# Patient Record
Sex: FEMALE | Race: WHITE
[De-identification: names, ages, dates, MRNs, and addresses within clinical notes are randomized per-mention and may not be internally consistent; named-entity substitution may affect disease eponyms.]

---

## 2019-08-11 ENCOUNTER — HOSPITAL ENCOUNTER (EMERGENCY)
Dept: HOSPITAL 35 - ER | Age: 84
Discharge: HOME | End: 2019-08-11
Payer: COMMERCIAL

## 2019-08-11 VITALS — WEIGHT: 160.01 LBS | HEIGHT: 65 IN | BODY MASS INDEX: 26.66 KG/M2

## 2019-08-11 VITALS — DIASTOLIC BLOOD PRESSURE: 51 MMHG | SYSTOLIC BLOOD PRESSURE: 144 MMHG

## 2019-08-11 DIAGNOSIS — I10: ICD-10-CM

## 2019-08-11 DIAGNOSIS — E11.9: ICD-10-CM

## 2019-08-11 DIAGNOSIS — E78.00: ICD-10-CM

## 2019-08-11 DIAGNOSIS — N17.9: Primary | ICD-10-CM

## 2019-08-11 DIAGNOSIS — R06.00: ICD-10-CM

## 2019-08-11 DIAGNOSIS — I48.91: ICD-10-CM

## 2019-08-11 DIAGNOSIS — Z88.0: ICD-10-CM

## 2019-08-11 DIAGNOSIS — Z88.6: ICD-10-CM

## 2019-08-11 LAB
ALBUMIN SERPL-MCNC: 3.2 G/DL (ref 3.4–5)
ALT SERPL-CCNC: 28 U/L (ref 30–65)
ANION GAP SERPL CALC-SCNC: 10 MMOL/L (ref 7–16)
AST SERPL-CCNC: 50 U/L (ref 15–37)
BASOPHILS NFR BLD AUTO: 0.8 % (ref 0–2)
BILIRUB DIRECT SERPL-MCNC: 0.2 MG/DL
BILIRUB SERPL-MCNC: 0.7 MG/DL
BUN SERPL-MCNC: 27 MG/DL (ref 7–18)
CALCIUM SERPL-MCNC: 9.6 MG/DL (ref 8.5–10.1)
CHLORIDE SERPL-SCNC: 105 MMOL/L (ref 98–107)
CO2 SERPL-SCNC: 22 MMOL/L (ref 21–32)
CREAT SERPL-MCNC: 1.4 MG/DL (ref 0.6–1)
EOSINOPHIL NFR BLD: 1.3 % (ref 0–3)
ERYTHROCYTE [DISTWIDTH] IN BLOOD BY AUTOMATED COUNT: 14.5 % (ref 10.5–14.5)
GLUCOSE SERPL-MCNC: 178 MG/DL (ref 74–106)
GRANULOCYTES NFR BLD MANUAL: 74.3 % (ref 36–66)
HCT VFR BLD CALC: 28.3 % (ref 37–47)
HGB BLD-MCNC: 9.4 GM/DL (ref 12–15)
LYMPHOCYTES NFR BLD AUTO: 14.1 % (ref 24–44)
MCH RBC QN AUTO: 28.2 PG (ref 26–34)
MCHC RBC AUTO-ENTMCNC: 33.3 G/DL (ref 28–37)
MCV RBC: 84.8 FL (ref 80–100)
MONOCYTES NFR BLD: 9.5 % (ref 1–8)
NEUTROPHILS # BLD: 7.6 THOU/UL (ref 1.4–8.2)
PLATELET # BLD: 262 THOU/UL (ref 150–400)
POTASSIUM SERPL-SCNC: 4.3 MMOL/L (ref 3.5–5.1)
PROT SERPL-MCNC: 6.5 G/DL (ref 6.4–8.2)
RBC # BLD AUTO: 3.34 MIL/UL (ref 4.2–5)
SODIUM SERPL-SCNC: 137 MMOL/L (ref 136–145)
TROPONIN I SERPL-MCNC: <0.06 NG/ML (ref ?–0.06)
WBC # BLD AUTO: 10.2 THOU/UL (ref 4–11)

## 2019-08-12 NOTE — EKG
[place] [address] [facility] [email or web address]
[place]  [address]
Phone:  [phone]                    ELECTROCARDIOGRAM REPORT      
_______________________________________________________________________________
 
Name:       MANDO OCONNELL          Room #:                     DEP [facility]#:      [id number]     Account #:      [id number]  
Admission:  19    Attend Phys:                          
Discharge:  19    Date of Birth:  [birth year]  
                                                          Report #: [id number]
   [id number]
_______________________________________________________________________________
THIS REPORT FOR:   //name//                          
 
                         [facility] ED
                                       
Test Date:    2019               Test Time:    16:12:32
Pat Name:     MANDO OCONNELL         Department:   
Patient ID:   [id number]            Room:          
Gender:       F                        Technician:   
:          [id number]               Requested By: Sumaya Arevalo
Order Number: [id number] MD:   Humphrey Craft
                                 Measurements
Intervals                              Axis          
Rate:         64                       P:            0
OR:           313                      QRS:          -3
QRSD:         103                      T:            102
QT:           431                                    
QTc:          445                                    
                           Interpretive Statements
Sinus rhythm
Prolonged OR interval
Early R-wave progression
Nonspecific ST and T wave abnormality
Compared to ECG 2009 12:23:13
Early R-wave progression is now present
Nonspecific ST and T wave abnormality is present
Electronically Signed On 2019 8:13:36 CDT by Humphrey Craft
[email or web address]
 
 
 
 
 
 
 
 
 
 
 
 
 
 
 
 
  <ELECTRONICALLY SIGNED>
   By: Humphrey Craft MD, [facility]   
  19     0813
D: 19 1612                           _____________________________________
T: 19 161                           Humphrey Craft MD, [facility]     /EPI

## 2019-08-28 ENCOUNTER — HOSPITAL ENCOUNTER (OUTPATIENT)
Dept: HOSPITAL 61 - PCVCCLINIC | Age: 84
Discharge: HOME | End: 2019-08-28
Attending: INTERNAL MEDICINE
Payer: MEDICARE

## 2019-08-28 DIAGNOSIS — E78.5: ICD-10-CM

## 2019-08-28 DIAGNOSIS — E78.00: ICD-10-CM

## 2019-08-28 DIAGNOSIS — Z79.82: ICD-10-CM

## 2019-08-28 DIAGNOSIS — I10: Primary | ICD-10-CM

## 2019-08-28 DIAGNOSIS — R00.1: ICD-10-CM

## 2019-08-28 DIAGNOSIS — E11.9: ICD-10-CM

## 2019-08-28 DIAGNOSIS — R06.02: ICD-10-CM

## 2019-08-28 DIAGNOSIS — Z88.0: ICD-10-CM

## 2019-08-28 DIAGNOSIS — R07.9: ICD-10-CM

## 2019-08-28 DIAGNOSIS — Z82.49: ICD-10-CM

## 2019-08-28 DIAGNOSIS — Z79.899: ICD-10-CM

## 2019-08-28 DIAGNOSIS — Z88.8: ICD-10-CM

## 2019-08-28 PROCEDURE — 93005 ELECTROCARDIOGRAM TRACING: CPT

## 2019-08-28 PROCEDURE — G0463 HOSPITAL OUTPT CLINIC VISIT: HCPCS

## 2019-08-28 PROCEDURE — 36415 COLL VENOUS BLD VENIPUNCTURE: CPT

## 2019-08-28 PROCEDURE — 80061 LIPID PANEL: CPT

## 2019-09-18 ENCOUNTER — HOSPITAL ENCOUNTER (OUTPATIENT)
Dept: HOSPITAL 61 - PCVCIMAG | Age: 84
Discharge: HOME | End: 2019-09-18
Attending: INTERNAL MEDICINE
Payer: MEDICARE

## 2019-09-18 DIAGNOSIS — E11.9: ICD-10-CM

## 2019-09-18 DIAGNOSIS — Z88.5: ICD-10-CM

## 2019-09-18 DIAGNOSIS — Z88.0: ICD-10-CM

## 2019-09-18 DIAGNOSIS — I06.2: Primary | ICD-10-CM

## 2019-09-18 DIAGNOSIS — I27.20: ICD-10-CM

## 2019-09-18 PROCEDURE — 78452 HT MUSCLE IMAGE SPECT MULT: CPT

## 2019-09-18 PROCEDURE — 93306 TTE W/DOPPLER COMPLETE: CPT

## 2019-09-18 PROCEDURE — A9500 TC99M SESTAMIBI: HCPCS

## 2019-09-18 PROCEDURE — 93017 CV STRESS TEST TRACING ONLY: CPT

## 2019-09-18 NOTE — PCVCIMAG
--------------- APPROVED REPORT --------------





Imaging Protocol: Rest Tc-99m/Stress Tc-99m 1 day

Study performed:  09/18/2019 09:17:24



Indication: Chest pain, Dyspnea

Patient Location: Out-Patient

Stress Nurse: Jaida Paredes RN, Kristy Bauman RN

NM Tech:FORREST MoyMT



Ht: 5 ft 5 in Wt: 160 lbs BSA:  1.80 m2

HR: 75 bpm                      BP: 195/83 mmHg         BMI:  

26.6

Rhythm:  NSR, First degree AV Block



Medical History

Medical History: Hyperlipidemia, HTN, Diabetes

Medications: ASA, Diltiazem, Furosemide, Losartan, Crestor

Allergies: PCN, Codeine

Cardiac Risk Factors: Age

Pretest Chest Pain Characteristics: No chest pain

Exercise History: Sedentary

Physical Disabilities: Knees



Resting Data

Rest SPECT myocardial perfusion imaging was performed in supine 

position 45 minutes following the intravenous injection of 9.8 mCi of 

Tc-99m Sestamibi.

Time of rest injection: 0850     Date: 09/18/2019

Administration Route: IV

Administration Site: Right Arm



Pharmacologic Stress

Pharmacologic stress test was performed by injecting Regadenoson 0.4 

mg IV push over 10-15 seconds immediately followed by the intravenous 

injection of 32.7 mCi of Tc-99m Sestamibi.

Time of stress injection: 1010     Date: 09/18/2019

Administration Route: IV

Administration Site: Right Arm

Gated Stress SPECT was performed 45 minutes after stress 

injection.

The images were gated to evaluate regional wall motion and calculate 

left ventricular ejection fraction. 



Stress Test Details

Stress Test:  Pharmacologic stress testing performed using 0.4 mg of 

regadenoson per 5 mL given IV over 10 seconds.

  Reason for pharmacologic stress test: physical limitation, knee 

issues.



HRMax Heart Rate (APMHR): 135 bpm 

Resting HR:            75 bpmTarget HR (85% APMHR): 114 bpm

Max HR Achieved:  93 bpm

% of APMHR:         68

Recovery HR:            90 bpm



BP

Resting BP:  195/83 mmHg

Max BP:       166/74 mmHg

Recovery BP:       175/74 mmHg

ECG

Resting ECG:  Sinus Rhythm, 1st degree AV block

Stress ECG:     Sinus Rhythm, 1st degree AV block,  NS T wave 

changes

Arrhythmia:    PAC's, PVC's

Recovery ECG: Sinus Rhythm, 1st degree AV block,  NS T wave 

changes



Clinical

Reason for Termination: Completed protocol

Stress Symptoms: Dyspnea, Chest pressure

Symptoms resolved with caffeine.



Stress ECG Conclusion

ECG: Non-ischemic



Study Quality

Study: Good



Study Data

Post stress, the left ventricular ejection was 72%..

SSS: 0

SRS: 0

SDS: 0

TID = 0.98.



Perfusion

No evidence of stress induced ischemia or prior myocardial 

infarction.



Wall Motion

Normal left ventricular size and function with no regional wall 

motion abnormalities.



Nuclear Conclusion

No evidence of stress induced ischemia or prior myocardial 

infarction.

Normal left ventricular size and function with no regional wall 

motion abnormalities.

Post stress, the left ventricular ejection was 72%. 

No prior study available for comparison.



Interpreted by:  Sae Caldwell MD

Electronically Approved: 09/18/2019 

12:26:08



<Conclusion>

ECG: Non-ischemic

## 2019-09-18 NOTE — PCVCIMAG
--------------- APPROVED REPORT --------------





Study performed:  2019 07:53:41



EXAM: Comprehensive 2D, Doppler, and color-flow 

Echocardiogram

Patient Location: Echo lab

Room #:  2Status:  routine



BSA:         1.80

HR: 72 bpmBP:          148/64 mmHg

Rhythm: NSR



Other Information 

Study Quality: Good



Risk Factors: 

Cardiac Risk Factors:  HTN, Hyperlipidemia, 

DM



Indications

Diabetes

Dyspnea 

Chest Pain

Hypertension/HDD



2D Dimensions

IVSd:  11.74 (7-11mm)LVOT Diam:  20.42 (18-24mm) 

LVDd:  48.85 mm

PWd:  10.40 (7-11mm)Ascending Ao:  31.44 (22-36mm)

LVDs:  42.55 (25-40mm)

Left Atrium:  43.67 (27-40mm)

Aortic Root:  22.26 mm

LV Single Plane 4CH:  57.75 %

LV Single Plane 2CH:  66.99 %

Biplane EF:  62.8 %



Volumes

Left Atrial Volume (Systole)

Single Plane 4CH:  60.20 mLSingle Plane 2CH:  71.61 mL

Biplane LA Volume:  69.00 mLLA ESV Index:  38.00 mL/m2



Aortic Valve

AoV Peak Thong.:  1.48 m/s

AO Peak Gr.:  8.79 mmHgLVOT Max P.23 mmHg

LVOT Max V:  0.75 m/s

ELMER Vmax: 1.65 cm2

AI Vmax:  4.26 m/s

AI Runnels:  1.86 m/s2

AI PHT:  672.72 ms



Mitral Valve

E/A Ratio:  0.6

MV Decel. Time:  141.34 ms

MV E Max Tohng.:  0.76 m/s

MV A Thong.:  1.19 m/s

IVRT:  72.66 ms



TDI

E/Lateral E':  12.67E/Medial E':  15.20

Medial E' Thong.:  0.05 m/s

Lateral E' Thong.:  0.06 m/s



Pulmonary Valve

PV Peak Thong.:  0.99 m/sPV Peak Gr.:  3.92 mmHg



Pulmonary Vein

P Vein S:    0.36 m/sP Vein A:  0.34 m/s

P Vein D:   0.64 m/sP Vein A Dur.:  83.0 msec

P Vein S/D Ratio:  0.56



Tricuspid Valve

TR Peak Thong.:  2.60 m/s

TR Peak Gr.:  27.03 mmHg

TV Vmax:  0.64 m/sPA Pressure:  34.00 mmHg



Left Ventricle

The left ventricle is normal size. There is normal LV segmental wall 

motion. There is normal left ventricular wall thickness. Left 

ventricular systolic function is normal. The left ventricular 

ejection fraction is within the normal range. LVEF is 60-65%. Grade I 

- abnormal relaxation pattern. Findings suggest the left atrial 

pressure is elevated.



Right Ventricle

The right ventricle is normal size. The right ventricular systolic 

function is normal.



Atria

Left atrium is mildly dilated. The right atrium size is 

normal.



Aortic Valve

Aortic valve is trileaflet. Mild aortic valve sclerosis. Trace to 

mild aortic regurgitation. There is no aortic valvular 

stenosis.



Mitral Valve

The mitral valve is normal in structure. There is no mitral valve 

regurgitation noted. No evidence of mitral valve stenosis.



Tricuspid Valve

The tricuspid valve is normal in structure. Trace tricuspid 

regurgitation with a PA pressure of 34 mmHg Mild pulmonary 

hypertension..



Pulmonic Valve

The pulmonary valve is normal in structure. Trace pulmonic 

regurgitation.



Great Vessels

The aortic root is normal in size. The ascending aorta is normal in 

size. Aortic arch is normal in caliber. IVC is normal in size and 

collapses >50% with inspiration.



Pericardium

There is no pericardial effusion. There is no pleural 

effusion.



<Conclusion>

The left ventricle is normal size.

LVEF is 60-65%.

Grade I - abnormal relaxation pattern.

Findings suggest the left atrial pressure is elevated.

The right ventricle is normal size.

Left atrium is mildly dilated.

Aortic valve is trileaflet.

Mild aortic valve sclerosis.

Trace to mild aortic regurgitation.

There is no mitral valve regurgitation noted.

Trace tricuspid regurgitation with a PA pressure of 34 mmHg

Mild pulmonary hypertension..

The aortic root is normal in size.

There is no pericardial effusion.

## 2020-05-29 ENCOUNTER — HOSPITAL ENCOUNTER (OUTPATIENT)
Dept: HOSPITAL 35 - SJCVC | Age: 85
End: 2020-05-29
Attending: INTERNAL MEDICINE
Payer: COMMERCIAL

## 2020-05-29 DIAGNOSIS — Z82.49: ICD-10-CM

## 2020-05-29 DIAGNOSIS — E78.5: ICD-10-CM

## 2020-05-29 DIAGNOSIS — Z79.899: ICD-10-CM

## 2020-05-29 DIAGNOSIS — Z79.84: ICD-10-CM

## 2020-05-29 DIAGNOSIS — E11.9: ICD-10-CM

## 2020-05-29 DIAGNOSIS — Z79.82: ICD-10-CM

## 2020-05-29 DIAGNOSIS — I44.0: Primary | ICD-10-CM

## 2020-05-29 DIAGNOSIS — I38: ICD-10-CM

## 2020-05-29 DIAGNOSIS — I10: ICD-10-CM

## 2020-05-29 DIAGNOSIS — E78.00: ICD-10-CM

## 2021-01-29 ENCOUNTER — HOSPITAL ENCOUNTER (OUTPATIENT)
Dept: HOSPITAL 35 - SJCVC | Age: 86
End: 2021-01-29
Attending: INTERNAL MEDICINE
Payer: COMMERCIAL

## 2021-01-29 DIAGNOSIS — Z79.82: ICD-10-CM

## 2021-01-29 DIAGNOSIS — E78.00: ICD-10-CM

## 2021-01-29 DIAGNOSIS — I44.7: ICD-10-CM

## 2021-01-29 DIAGNOSIS — Z88.0: ICD-10-CM

## 2021-01-29 DIAGNOSIS — E11.9: ICD-10-CM

## 2021-01-29 DIAGNOSIS — I48.91: ICD-10-CM

## 2021-01-29 DIAGNOSIS — I38: ICD-10-CM

## 2021-01-29 DIAGNOSIS — Z79.899: ICD-10-CM

## 2021-01-29 DIAGNOSIS — I10: ICD-10-CM

## 2021-01-29 DIAGNOSIS — Z88.5: ICD-10-CM

## 2021-01-29 DIAGNOSIS — I44.0: ICD-10-CM

## 2021-01-29 DIAGNOSIS — Z72.89: ICD-10-CM

## 2021-01-29 DIAGNOSIS — R94.31: Primary | ICD-10-CM

## 2021-09-14 ENCOUNTER — HOSPITAL ENCOUNTER (OUTPATIENT)
Dept: HOSPITAL 35 - SJCVC | Age: 86
End: 2021-09-14
Attending: INTERNAL MEDICINE
Payer: COMMERCIAL

## 2021-09-14 DIAGNOSIS — I10: ICD-10-CM

## 2021-09-14 DIAGNOSIS — E78.00: ICD-10-CM

## 2021-09-14 DIAGNOSIS — I44.7: ICD-10-CM

## 2021-09-14 DIAGNOSIS — I48.91: ICD-10-CM

## 2021-09-14 DIAGNOSIS — Z79.82: ICD-10-CM

## 2021-09-14 DIAGNOSIS — Z79.899: ICD-10-CM

## 2021-09-14 DIAGNOSIS — E11.9: ICD-10-CM

## 2021-09-14 DIAGNOSIS — Z88.2: ICD-10-CM

## 2021-09-14 DIAGNOSIS — R94.31: Primary | ICD-10-CM

## 2021-09-14 DIAGNOSIS — Z72.89: ICD-10-CM

## 2021-09-14 DIAGNOSIS — Z88.5: ICD-10-CM
